# Patient Record
Sex: FEMALE | Race: WHITE | NOT HISPANIC OR LATINO | Employment: FULL TIME | URBAN - METROPOLITAN AREA
[De-identification: names, ages, dates, MRNs, and addresses within clinical notes are randomized per-mention and may not be internally consistent; named-entity substitution may affect disease eponyms.]

---

## 2023-10-16 PROBLEM — E78.5 BORDERLINE HYPERLIPIDEMIA: Status: ACTIVE | Noted: 2023-10-16

## 2023-10-16 PROBLEM — M81.0 AGE RELATED OSTEOPOROSIS: Status: ACTIVE | Noted: 2023-10-16

## 2023-10-16 PROBLEM — I10 HTN (HYPERTENSION): Status: ACTIVE | Noted: 2023-10-16

## 2023-10-16 PROBLEM — E55.9 VITAMIN D DEFICIENCY: Status: ACTIVE | Noted: 2023-10-16

## 2023-10-16 PROBLEM — R53.83 FATIGUE: Status: ACTIVE | Noted: 2023-10-16

## 2023-10-16 RX ORDER — LISINOPRIL 10 MG/1
1 TABLET ORAL DAILY
COMMUNITY
Start: 2020-03-02 | End: 2023-11-14 | Stop reason: SDUPTHER

## 2023-11-14 ENCOUNTER — OFFICE VISIT (OUTPATIENT)
Dept: PRIMARY CARE | Facility: CLINIC | Age: 78
End: 2023-11-14
Payer: MEDICARE

## 2023-11-14 VITALS
WEIGHT: 120 LBS | HEART RATE: 80 BPM | OXYGEN SATURATION: 97 % | SYSTOLIC BLOOD PRESSURE: 134 MMHG | DIASTOLIC BLOOD PRESSURE: 80 MMHG | BODY MASS INDEX: 25.19 KG/M2 | HEIGHT: 58 IN

## 2023-11-14 DIAGNOSIS — Z00.00 MEDICARE ANNUAL WELLNESS VISIT, SUBSEQUENT: ICD-10-CM

## 2023-11-14 DIAGNOSIS — M81.0 AGE-RELATED OSTEOPOROSIS WITHOUT CURRENT PATHOLOGICAL FRACTURE: ICD-10-CM

## 2023-11-14 DIAGNOSIS — I10 HYPERTENSION, UNSPECIFIED TYPE: ICD-10-CM

## 2023-11-14 DIAGNOSIS — Z00.00 ENCOUNTER FOR HEALTH MAINTENANCE EXAMINATION: Primary | ICD-10-CM

## 2023-11-14 DIAGNOSIS — E78.5 BORDERLINE HYPERLIPIDEMIA: ICD-10-CM

## 2023-11-14 DIAGNOSIS — E55.9 VITAMIN D DEFICIENCY: ICD-10-CM

## 2023-11-14 PROCEDURE — 1160F RVW MEDS BY RX/DR IN RCRD: CPT | Performed by: FAMILY MEDICINE

## 2023-11-14 PROCEDURE — 3079F DIAST BP 80-89 MM HG: CPT | Performed by: FAMILY MEDICINE

## 2023-11-14 PROCEDURE — 3075F SYST BP GE 130 - 139MM HG: CPT | Performed by: FAMILY MEDICINE

## 2023-11-14 PROCEDURE — 99214 OFFICE O/P EST MOD 30 MIN: CPT | Performed by: FAMILY MEDICINE

## 2023-11-14 PROCEDURE — 1036F TOBACCO NON-USER: CPT | Performed by: FAMILY MEDICINE

## 2023-11-14 PROCEDURE — 1159F MED LIST DOCD IN RCRD: CPT | Performed by: FAMILY MEDICINE

## 2023-11-14 PROCEDURE — 1170F FXNL STATUS ASSESSED: CPT | Performed by: FAMILY MEDICINE

## 2023-11-14 RX ORDER — LISINOPRIL 10 MG/1
10 TABLET ORAL DAILY
Qty: 90 TABLET | Refills: 3 | Status: SHIPPED | OUTPATIENT
Start: 2023-11-14

## 2023-11-14 ASSESSMENT — ACTIVITIES OF DAILY LIVING (ADL)
GROCERY_SHOPPING: INDEPENDENT
DOING_HOUSEWORK: INDEPENDENT
MANAGING_FINANCES: INDEPENDENT
BATHING: INDEPENDENT
TAKING_MEDICATION: INDEPENDENT
DRESSING: INDEPENDENT

## 2023-11-14 ASSESSMENT — PATIENT HEALTH QUESTIONNAIRE - PHQ9
2. FEELING DOWN, DEPRESSED OR HOPELESS: NOT AT ALL
1. LITTLE INTEREST OR PLEASURE IN DOING THINGS: NOT AT ALL
SUM OF ALL RESPONSES TO PHQ9 QUESTIONS 1 AND 2: 0

## 2023-11-14 NOTE — PROGRESS NOTES
General Medical Management Note and annual Medicare wellness visit    78 y.o. female presents for Medical Management  HPI    HTN - takes lisinopril only twice per week.  Uses several OTC nutritional supplements.  Exercises daily.  Healthy diet.      Fell once in the past 6 months - torn meniscus of knee.  MRI 4/2023.  Tx with dry needling by sports med physician.  Feeling good!    Another sebaceous cyst on scalp.  Would like removed.  Becoming irritated during grooming    Cologuard 2020 negative - screening no longer indicated due to age  Breast Cancer Screening - screening no longer indicated due to age  Cervical Cancer Screening - screening no longer indicated due to age    Immunizations refused by patient    Past Medical History:   Diagnosis Date    Other shoulder lesions, unspecified shoulder 12/30/2014    Rotator cuff tendinitis    Personal history of other diseases of the nervous system and sense organs 12/09/2015    History of sciatica      Past Surgical History:   Procedure Laterality Date    BREAST LUMPECTOMY  12/30/2014    Breast Surgery Lumpectomy    DILATION AND CURETTAGE OF UTERUS  12/30/2014    Dilation And Curettage     Family History   Problem Relation Name Age of Onset    Pancreatic cancer Father        Social History     Socioeconomic History    Marital status:      Spouse name: Not on file    Number of children: Not on file    Years of education: Not on file    Highest education level: Not on file   Occupational History    Not on file   Tobacco Use    Smoking status: Never    Smokeless tobacco: Never   Substance and Sexual Activity    Alcohol use: Not on file    Drug use: Not on file    Sexual activity: Not on file   Other Topics Concern    Not on file   Social History Narrative    Not on file     Social Determinants of Health     Financial Resource Strain: Not on file   Food Insecurity: Not on file   Transportation Needs: Not on file   Physical Activity: Not on file   Stress: Not on file  "  Social Connections: Not on file   Intimate Partner Violence: Not on file   Housing Stability: Not on file       Current Outpatient Medications on File Prior to Visit   Medication Sig Dispense Refill    lisinopril 10 mg tablet Take 1 tablet (10 mg) by mouth once daily.       No current facility-administered medications on file prior to visit.       Allergies   Allergen Reactions    Sulfa (Sulfonamide Antibiotics) Unknown         ROS: Denies chest pain, SOB, Headache, GI problems     Visit Vitals  /80   Pulse 80   Ht 1.473 m (4' 10\")   Wt 54.4 kg (120 lb)   SpO2 97%   BMI 25.08 kg/m²   Smoking Status Never   BSA 1.49 m²        PHYSICAL EXAM:  Gen.: Alert and oriented ×3 female in no acute distress.  HEENT: Head is normocephalic.  Pupils equal and reactive to light.  Tympanic membranes are clear.  Pharynx is clear.  Neck is supple without adenopathy or carotid bruits.  No masses or thyromegaly  Heart: Regular rate and rhythm without murmurs.  Lungs: Clear to auscultation bilaterally.  Abdomen: Soft with normal bowel sounds.  No masses or pain to palpation.  No bruits auscultated.  Extremities: Good range of motion of all joints.  No significant edema. Pedal pulses +1-2/4  Skin: No significant or irregular nevi visualized.  Neuro: No signs of focal neurologic deficit.  No tremor.  Speech and hearing are normal.  DTRs +3/4;  Muscle Strength +5/5.  Musculoskeletal: Spine with good ROM.  No scoliosis.  Leg lengths are equal.  Psych: normal affect.  No suicidal ideation.  Good judgement and insight.    The 10-year ASCVD risk score (Shagufta DUNCAN, et al., 2019) is: 25.9%    Values used to calculate the score:      Age: 78 years      Sex: Female      Is Non- : No      Diabetic: No      Tobacco smoker: No      Systolic Blood Pressure: 134 mmHg      Is BP treated: Yes      HDL Cholesterol: 94 mg/dL      Total Cholesterol: 202 mg/dL      DIAGNOSIS/PLAN:    2. Vitamin D deficiency  - Vitamin D " 25-Hydroxy,Total (for eval of Vitamin D levels); Future    3. Hypertension, unspecified type  - Comprehensive Metabolic Panel; Future  - lisinopril 10 mg tablet; Take 1 tablet (10 mg) by mouth once daily.  Dispense: 90 tablet; Refill: 3  - Comprehensive Metabolic Panel; Future  - CBC; Future  - Lipid Panel; Future  - TSH with reflex to Free T4 if abnormal; Future  - Urinalysis with Reflex Microscopic; Future      4. Age-related osteoporosis without current pathological fracture  - XR DEXA bone density; Future    5. Medicare annual wellness visit, subsequent  Living Will / Advanced Care Planning:  Discussed advance care planning including explanation and discussion of advanced directives.  Patient does have current up-to-date documents.       6. Borderline hyperlipidemia      Return to office in 12 months for comprehensive medical evaluation, long-term medication use monitoring, and preventative services screening    We will continue to monitor, evaluate, assess and treat all problems/diagnoses as appropriate and continue to collaborate with specialists.    Encouraged to sign up with [facility]    Contact office or send a  The Editorial[employer] message with any questions or concerns    Patient will only be notified of labs that require medical intervention.    Prescriptions will not be filled unless you are compliant with your follow up appointments or have a follow up appointment scheduled as per instruction of your physician. Refills should be requested at the time of your visit.    **Charting was completed using voice recognition technology and may include unintended errors**    Jose Guadalupe Marx DO, FACOFP  [address] [facility], [address]  [place] [address] [phone]    Jose Guadalupe Marx DO, FACOFP      Subjective   Reason for Visit: Brooke Chua is an 78 y.o. female here for a Medicare Wellness visit.               HPI    Patient Care Team:  Jose Guadalupe Marx DO as PCP - General  Jose Guadalupe Marx DO as PCP - [facility] Attributed Provider  Jose Guadalupe YEPEZ  "DO Francisco J as PCP - [employer] PCP     Review of Systems    Objective   Vitals:  /80   Pulse 80   Ht 1.473 m (4' 10\")   Wt 54.4 kg (120 lb)   SpO2 97%   BMI 25.08 kg/m²       Physical Exam    Assessment/Plan   Problem List Items Addressed This Visit       Age related osteoporosis    Relevant Orders    XR DEXA bone density    Fatigue    Relevant Orders    CBC    TSH with reflex to Free T4 if abnormal    HTN (hypertension)    Relevant Orders    Comprehensive Metabolic Panel    Vitamin D deficiency    Relevant Orders    Vitamin D 25-Hydroxy,Total (for eval of Vitamin D levels)     Other Visit Diagnoses       Routine general medical examination at health care facility    -  Primary    Encounter for health maintenance examination        Relevant Orders    Comprehensive Metabolic Panel    CBC    Lipid Panel    TSH with reflex to Free T4 if abnormal    Urinalysis with Reflex Microscopic    Screening, lipid        Relevant Orders    Lipid Panel               "

## 2023-12-05 ENCOUNTER — ANCILLARY PROCEDURE (OUTPATIENT)
Dept: RADIOLOGY | Facility: CLINIC | Age: 78
End: 2023-12-05
Payer: MEDICARE

## 2023-12-05 DIAGNOSIS — M81.0 AGE-RELATED OSTEOPOROSIS WITHOUT CURRENT PATHOLOGICAL FRACTURE: ICD-10-CM

## 2023-12-05 PROCEDURE — 77080 DXA BONE DENSITY AXIAL: CPT | Performed by: STUDENT IN AN ORGANIZED HEALTH CARE EDUCATION/TRAINING PROGRAM

## 2023-12-05 PROCEDURE — 77080 DXA BONE DENSITY AXIAL: CPT

## 2024-01-02 ENCOUNTER — APPOINTMENT (OUTPATIENT)
Dept: PRIMARY CARE | Facility: CLINIC | Age: 79
End: 2024-01-02
Payer: COMMERCIAL

## 2024-02-15 ENCOUNTER — PROCEDURE VISIT (OUTPATIENT)
Dept: PRIMARY CARE | Facility: CLINIC | Age: 79
End: 2024-02-15
Payer: MEDICARE

## 2024-02-15 DIAGNOSIS — L72.3 SEBACEOUS CYST: Primary | ICD-10-CM

## 2024-02-15 PROCEDURE — 11421 EXC H-F-NK-SP B9+MARG 0.6-1: CPT | Performed by: FAMILY MEDICINE

## 2024-02-15 PROCEDURE — 11423 EXC H-F-NK-SP B9+MARG 2.1-3: CPT | Performed by: FAMILY MEDICINE

## 2024-02-15 PROCEDURE — 11422 EXC H-F-NK-SP B9+MARG 1.1-2: CPT | Performed by: FAMILY MEDICINE

## 2024-02-15 NOTE — PROGRESS NOTES
Subjective     Patient ID: [id number]     Brooke Chua is a 79 y.o. female who presents for No chief complaint on file..    HPI  Cyst removal from scalp.  She had has had multiple cysts excised from her scalp over the past 20 years.  Patient feels that she has 3 cysts on the crown of her scalp and requests excision and dissected removal of cyst.  She was informed of the potential complications including bleeding, pain and infection.  -The patient states that these lesions have been growing in size over the past several years.  They are now painful when brushing her hair.  She denies drainage from any of the cystic lesions      Objective   There were no vitals taken for this visit.   Physical Exam:   Palpation of scalp reveals 3 areas on the crown of her scalp with subcutaneous structures resembling sebaceous cysts.  For purposes of documentation, the 3 cysts will bleed labeled 12:00 noon, 2:00 and 6:00.  Measurements: 12 noon = 2 cm, 2:00 = 1 cm, 6:00 = 3 cm    Involved scalp was cleansed with isopropyl alcohol  1% plain  lidocaine was infiltrated subcutaneously over the cysts.  After adequate anesthesia, a #15 blade was used to make a linear incision.  Cyst was dissected free from the dermis and removed.  All cysts were ruptured prior to excision.  A cheesy whitish material was extracted.  -2 sebaceous cysts were extracted from the 2:00 scalp wound.    Single 4-0 Ethilon suture was placed in the 12:00, 2:00 scalp incisions.  Two 4-0 Ethilon sutures placed in the 6:00 scalp incision.    Assessment/Plan   1. Sebaceous cyst x 4 - scalp  Keep incisions dry for 24 hours then may wash gently with shampoo    If pain or significant bulging of the scalp occurs, demonstrated gentle pressure over the wound as likely etiology is hematoma.  Anticipate being able to relieve symptoms by expressing the hematoma through the incision.    Return to office in 1 week for suture removal      Problem List Items Addressed This Visit     None      Jose Guadalupe Marx DO     Foreign Body Removal    Date/Time: 2/15/2024 1:29 PM    Performed by: Jose Guadalupe Marx DO  Authorized by: Jose Guadalupe Marx DO    Consent:     Consent obtained:  Verbal    Consent given by:  Patient    Risks discussed:  Bleeding, infection, pain and poor cosmetic result    Alternatives discussed:  No treatment  Universal protocol:     Procedure explained and questions answered to patient or proxy's satisfaction: yes      Immediately prior to procedure, a time out was called: yes      Patient identity confirmed:  Verbally with patient  Location:     Location:  Scalp    Scalp location:  Crown    Depth:  Intradermal    Tendon involvement:  None  Pre-procedure details:     Imaging:  None    Neurovascular status: intact

## 2024-02-22 ENCOUNTER — OFFICE VISIT (OUTPATIENT)
Dept: PRIMARY CARE | Facility: CLINIC | Age: 79
End: 2024-02-22
Payer: MEDICARE

## 2024-02-22 DIAGNOSIS — L72.3 SEBACEOUS CYST: Primary | ICD-10-CM

## 2024-02-22 PROCEDURE — 1036F TOBACCO NON-USER: CPT | Performed by: FAMILY MEDICINE

## 2024-02-27 NOTE — PROGRESS NOTES
Subjective     Patient ID: [id number]     Brooke Chua is a 79 y.o. female who presents for Suture / Staple Removal.    HPI  In office Procedure follow up  Objective   There were no vitals taken for this visit.   Physical Exam:   3 scalp wounds.  Well healed from 2/15/24 office procedure.  Single interrupted suture removed without difficulty.  Neosporin applied.  Tolerated well.     Assessment/Plan   1. Sebaceous cyst  No further treatment required  Notify me if any of the wounds swell, become painful or are bleeding.    Problem List Items Addressed This Visit    None  Visit Diagnoses       Sebaceous cyst    -  Primary            Jose Guadalupe Marx DO

## 2024-04-03 ENCOUNTER — PATIENT OUTREACH (OUTPATIENT)
Dept: PRIMARY CARE | Facility: CLINIC | Age: 79
End: 2024-04-03
Payer: MEDICARE

## 2024-12-27 ENCOUNTER — APPOINTMENT (OUTPATIENT)
Dept: PRIMARY CARE | Facility: CLINIC | Age: 79
End: 2024-12-27
Payer: MEDICARE

## 2024-12-27 VITALS
HEIGHT: 58 IN | HEART RATE: 74 BPM | DIASTOLIC BLOOD PRESSURE: 100 MMHG | WEIGHT: 116.2 LBS | OXYGEN SATURATION: 100 % | SYSTOLIC BLOOD PRESSURE: 190 MMHG | BODY MASS INDEX: 24.39 KG/M2

## 2024-12-27 DIAGNOSIS — M81.0 AGE-RELATED OSTEOPOROSIS WITHOUT CURRENT PATHOLOGICAL FRACTURE: ICD-10-CM

## 2024-12-27 DIAGNOSIS — E55.9 VITAMIN D DEFICIENCY: ICD-10-CM

## 2024-12-27 DIAGNOSIS — R30.0 DYSURIA: ICD-10-CM

## 2024-12-27 DIAGNOSIS — I10 HYPERTENSION, UNSPECIFIED TYPE: ICD-10-CM

## 2024-12-27 DIAGNOSIS — N63.41 SUBAREOLAR MASS OF RIGHT BREAST: Primary | ICD-10-CM

## 2024-12-27 DIAGNOSIS — Z00.00 MEDICARE ANNUAL WELLNESS VISIT, SUBSEQUENT: ICD-10-CM

## 2024-12-27 LAB
25(OH)D3 SERPL-MCNC: 69 NG/ML (ref 30–100)
ALBUMIN SERPL BCP-MCNC: 4.6 G/DL (ref 3.4–5)
ALP SERPL-CCNC: 67 U/L (ref 33–136)
ALT SERPL W P-5'-P-CCNC: 19 U/L (ref 7–45)
ANION GAP SERPL CALC-SCNC: 9 MMOL/L (ref 10–20)
APPEARANCE UR: CLEAR
AST SERPL W P-5'-P-CCNC: 18 U/L (ref 9–39)
BILIRUB SERPL-MCNC: 0.4 MG/DL (ref 0–1.2)
BILIRUB UR STRIP.AUTO-MCNC: NEGATIVE MG/DL
BUN SERPL-MCNC: 12 MG/DL (ref 6–23)
CALCIUM SERPL-MCNC: 9.7 MG/DL (ref 8.6–10.3)
CHLORIDE SERPL-SCNC: 100 MMOL/L (ref 98–107)
CHOLEST SERPL-MCNC: 224 MG/DL (ref 0–199)
CHOLESTEROL/HDL RATIO: 2.4
CO2 SERPL-SCNC: 29 MMOL/L (ref 21–32)
COLOR UR: ABNORMAL
CREAT SERPL-MCNC: 0.73 MG/DL (ref 0.5–1.05)
EGFRCR SERPLBLD CKD-EPI 2021: 85 ML/MIN/1.73M*2
ERYTHROCYTE [DISTWIDTH] IN BLOOD BY AUTOMATED COUNT: 13.3 % (ref 11.5–14.5)
GLUCOSE SERPL-MCNC: 96 MG/DL (ref 74–99)
GLUCOSE UR STRIP.AUTO-MCNC: NORMAL MG/DL
HCT VFR BLD AUTO: 43.7 % (ref 36–46)
HDLC SERPL-MCNC: 95.1 MG/DL
HGB BLD-MCNC: 14.3 G/DL (ref 12–16)
KETONES UR STRIP.AUTO-MCNC: NEGATIVE MG/DL
LDLC SERPL CALC-MCNC: 111 MG/DL
LEUKOCYTE ESTERASE UR QL STRIP.AUTO: ABNORMAL
MCH RBC QN AUTO: 30.8 PG (ref 26–34)
MCHC RBC AUTO-ENTMCNC: 32.7 G/DL (ref 32–36)
MCV RBC AUTO: 94 FL (ref 80–100)
NITRITE UR QL STRIP.AUTO: NEGATIVE
NON HDL CHOLESTEROL: 129 MG/DL (ref 0–149)
NRBC BLD-RTO: 0 /100 WBCS (ref 0–0)
PH UR STRIP.AUTO: 6.5 [PH]
PLATELET # BLD AUTO: 221 X10*3/UL (ref 150–450)
POTASSIUM SERPL-SCNC: 4.6 MMOL/L (ref 3.5–5.3)
PROT SERPL-MCNC: 7.3 G/DL (ref 6.4–8.2)
PROT UR STRIP.AUTO-MCNC: NEGATIVE MG/DL
RBC # BLD AUTO: 4.64 X10*6/UL (ref 4–5.2)
RBC # UR STRIP.AUTO: NEGATIVE /UL
RBC #/AREA URNS AUTO: NORMAL /HPF
SODIUM SERPL-SCNC: 133 MMOL/L (ref 136–145)
SP GR UR STRIP.AUTO: 1.01
TRIGL SERPL-MCNC: 92 MG/DL (ref 0–149)
TSH SERPL-ACNC: 2.27 MIU/L (ref 0.44–3.98)
UROBILINOGEN UR STRIP.AUTO-MCNC: NORMAL MG/DL
VLDL: 18 MG/DL (ref 0–40)
WBC # BLD AUTO: 7.6 X10*3/UL (ref 4.4–11.3)
WBC #/AREA URNS AUTO: NORMAL /HPF

## 2024-12-27 PROCEDURE — 1123F ACP DISCUSS/DSCN MKR DOCD: CPT | Performed by: FAMILY MEDICINE

## 2024-12-27 PROCEDURE — 1159F MED LIST DOCD IN RCRD: CPT | Performed by: FAMILY MEDICINE

## 2024-12-27 PROCEDURE — 81001 URINALYSIS AUTO W/SCOPE: CPT

## 2024-12-27 PROCEDURE — 84443 ASSAY THYROID STIM HORMONE: CPT

## 2024-12-27 PROCEDURE — 1036F TOBACCO NON-USER: CPT | Performed by: FAMILY MEDICINE

## 2024-12-27 PROCEDURE — 82306 VITAMIN D 25 HYDROXY: CPT

## 2024-12-27 PROCEDURE — 1170F FXNL STATUS ASSESSED: CPT | Performed by: FAMILY MEDICINE

## 2024-12-27 PROCEDURE — 3077F SYST BP >= 140 MM HG: CPT | Performed by: FAMILY MEDICINE

## 2024-12-27 PROCEDURE — G0439 PPPS, SUBSEQ VISIT: HCPCS | Performed by: FAMILY MEDICINE

## 2024-12-27 PROCEDURE — 3080F DIAST BP >= 90 MM HG: CPT | Performed by: FAMILY MEDICINE

## 2024-12-27 PROCEDURE — 80053 COMPREHEN METABOLIC PANEL: CPT

## 2024-12-27 PROCEDURE — 80061 LIPID PANEL: CPT

## 2024-12-27 PROCEDURE — 99215 OFFICE O/P EST HI 40 MIN: CPT | Performed by: FAMILY MEDICINE

## 2024-12-27 PROCEDURE — 99497 ADVNCD CARE PLAN 30 MIN: CPT | Performed by: FAMILY MEDICINE

## 2024-12-27 PROCEDURE — 93000 ELECTROCARDIOGRAM COMPLETE: CPT | Performed by: FAMILY MEDICINE

## 2024-12-27 PROCEDURE — 85027 COMPLETE CBC AUTOMATED: CPT

## 2024-12-27 PROCEDURE — 1160F RVW MEDS BY RX/DR IN RCRD: CPT | Performed by: FAMILY MEDICINE

## 2024-12-27 ASSESSMENT — PATIENT HEALTH QUESTIONNAIRE - PHQ9
SUM OF ALL RESPONSES TO PHQ9 QUESTIONS 1 AND 2: 0
SUM OF ALL RESPONSES TO PHQ9 QUESTIONS 1 AND 2: 0
2. FEELING DOWN, DEPRESSED OR HOPELESS: NOT AT ALL
1. LITTLE INTEREST OR PLEASURE IN DOING THINGS: NOT AT ALL
1. LITTLE INTEREST OR PLEASURE IN DOING THINGS: NOT AT ALL
2. FEELING DOWN, DEPRESSED OR HOPELESS: NOT AT ALL

## 2024-12-27 ASSESSMENT — ACTIVITIES OF DAILY LIVING (ADL)
GROCERY_SHOPPING: INDEPENDENT
DRESSING: INDEPENDENT
BATHING: INDEPENDENT
MANAGING_FINANCES: INDEPENDENT
TAKING_MEDICATION: INDEPENDENT
DOING_HOUSEWORK: INDEPENDENT

## 2024-12-27 NOTE — PROGRESS NOTES
Annual Comprehensive Medical Exam and annual Medicare wellness visit    79 y.o. female presents for annual comprehensive medical evaluation and preventive services screening.  No recent hospitalizations, surgeries or significant injuries.    History of Present Illness    Osteoporosis screening with DEXA scan revealed significant osteoporosis in the spine and femur/femoral neck.  Declines aggressive Tx with medication    Declines mammograms  Declines immunizations  Declines colon cancer screening    Blood pressure extremely elevated during office visits.  Patient declines to treat with medication.  Patient brings a log of multiple blood pressure readings from home.  The majority are in the 120s/90s.  Patient is aware that her diastolic blood pressure is elevated.      Right breast mass.  H/o abnormal mass on mammogram.  Biopsied.  Benign.  No longer trusts mammogram.  Concerned because felt something new at beginning of this month.  Has fibrocystic disease. Hasn't noticed any skin change or other observational changes.  Having a Thermogram next month at Dr. Danyelle schultz's office    Exercises daily.  Maintains ideal body weight.  Healthy diet.  No tobacco use.    Past Medical History:   Diagnosis Date    Other shoulder lesions, unspecified shoulder 12/30/2014    Rotator cuff tendinitis    Personal history of other diseases of the nervous system and sense organs 12/09/2015    History of sciatica      Past Surgical History:   Procedure Laterality Date    BREAST LUMPECTOMY  12/30/2014    Breast Surgery Lumpectomy    DILATION AND CURETTAGE OF UTERUS  12/30/2014    Dilation And Curettage     Family History   Problem Relation Name Age of Onset    Pancreatic cancer Father        Social History     Socioeconomic History    Marital status:      Spouse name: Not on file    Number of children: Not on file    Years of education: Not on file    Highest education level: Not on file   Occupational History    Not on file  "  Tobacco Use    Smoking status: Never    Smokeless tobacco: Never   Substance and Sexual Activity    Alcohol use: Not Currently    Drug use: Never    Sexual activity: Not on file   Other Topics Concern    Not on file   Social History Narrative    Not on file     Social Drivers of Health     Financial Resource Strain: Not on file   Food Insecurity: Not on file   Transportation Needs: Not on file   Physical Activity: Not on file   Stress: Not on file   Social Connections: Not on file   Intimate Partner Violence: Not on file   Housing Stability: Not on file       Current Outpatient Medications on File Prior to Visit   Medication Sig Dispense Refill    lisinopril 10 mg tablet Take 1 tablet (10 mg) by mouth once daily. (Patient not taking: Reported on 12/27/2024) 90 tablet 3     No current facility-administered medications on file prior to visit.       Allergies   Allergen Reactions    Sulfa (Sulfonamide Antibiotics) Unknown       Complete review of systems is negative today except for that mentioned in the history of present illness.  In particular patient denies chest pain, shortness of breath, headaches and GI disturbances.      Visit Vitals  BP (!) 190/100 (BP Location: Right arm, Patient Position: Sitting)   Pulse 74   Ht 1.473 m (4' 10\")   Wt 52.7 kg (116 lb 3.2 oz)   SpO2 100%   BMI 24.29 kg/m²   Smoking Status Never   BSA 1.47 m²      Vitals:    12/27/24 0953   BP: (!) 190/100   BP Location: Right arm   Patient Position: Sitting   Pulse: 74   SpO2: 100%   Weight: 52.7 kg (116 lb 3.2 oz)   Height: 1.473 m (4' 10\")     Physical Exam  Gen.: Alert and oriented ×3 female in no acute distress.  HEENT: Head is normocephalic.  Pupils equal and reactive to light.  Tympanic membranes are clear.  Pharynx is clear.  Neck is supple without adenopathy or carotid bruits.  No masses or thyromegaly  Heart: Regular rate and rhythm without murmurs.  Lungs: Clear to auscultation bilaterally.  Bilateral breast exam:  right breast " with subareolar smooth mobile mass.  Skin normal.  No galactorrhea.     Left breast exam normal.    Abdomen: Soft with normal bowel sounds.  No masses or pain to palpation.  No bruits auscultated.  Extremities: Good range of motion of all joints.  No significant edema. Pedal pulses +1-2/4  Skin: No significant or irregular nevi visualized.  Neuro: No signs of focal neurologic deficit.  No tremor.  Speech and hearing are normal.  DTRs +3/4;  Muscle Strength +5/5.  Musculoskeletal: Spine with good ROM.  No scoliosis.  Leg lengths are equal.  Psych: normal affect.  No suicidal ideation.  Good judgement and insight.     The ASCVD Risk score (Shagufta DUNCAN, et al., 2019) failed to calculate for the following reasons:    Cannot find a previous HDL lab    Cannot find a previous total cholesterol lab    ECG does not show signs of LVH from uncontrolled high blood pressure    Diagnosis/Plan  1. Hypertension, unspecified type (Primary)  -Hypertension: Discussed importance of good blood pressure control to avoid long-term complications such as heart attack and stroke.  Patient is aware that blood pressure goal is less than 130/80.  Maintaining a regular exercise program and body mass index (BMI) less than 25 as well as a diet lower in carbohydrates will help reach these goals.  - Requested patient bring her home blood pressure device to the office with each visit  - Comprehensive Metabolic Panel  - Lipid Panel  - TSH with reflex to Free T4 if abnormal    2. Age-related osteoporosis without current pathological fracture  -Patient is aware of her 2023 DEXA scan results.  She takes calcium and vitamin D supplements.  She does weightbearing exercise daily and yet the single DEXA scan that she has had in her life shows significant osteoporosis.  Patient declines to use medication.  She is aware of the increased risk for hip and spine fracture.    3. Medicare annual wellness visit, subsequent  Living Will / Advanced Care Planning: I  spent more than 15 minutes discussing advance care planning including explanation and discussion of advanced directives.  If patient does not have current up-to-date documents, examples and information were provided on how to create both living will and power of .  Patient was encouraged to work on completing these documents.      4. Vitamin D deficiency  - CBC  - Vitamin D 25-Hydroxy,Total (for eval of Vitamin D levels)    5. Dysuria  - Urinalysis with Reflex Microscopic    6.  Right breast mass  -Examination is not suspicious for malignancy.  Thermogram scheduled.  Patient will continue to monitor for change in size or skin changes and will notify me immediately.            Return to office in 6 months for comprehensive medical evaluation, long-term medication use monitoring, and preventative services screening    We will continue to monitor, evaluate, assess and treat all problems/diagnoses as appropriate and continue to collaborate with specialists.    Encouraged to sign up with [facility]    Contact office or send a  the [employer] message with any questions or concerns    Patient will only be notified of labs that require medical intervention.    Prescriptions will not be filled unless you are compliant with your follow up appointments or have a follow up appointment scheduled as per instruction of your physician. Refills should be requested at the time of your visit.    **Charting was completed using voice recognition technology and may include unintended errors**    Jose Guadalupe Marx DO, RADHAP  [id number] [id number]  [place] [address] [phone]    Jose Guadalupe Marx DO, RADHAP

## 2025-01-03 DIAGNOSIS — N63.41 SUBAREOLAR MASS OF RIGHT BREAST: Primary | ICD-10-CM

## 2025-01-13 DIAGNOSIS — N63.41 SUBAREOLAR MASS OF RIGHT BREAST: Primary | ICD-10-CM

## 2025-01-20 ENCOUNTER — HOSPITAL ENCOUNTER (OUTPATIENT)
Dept: RADIOLOGY | Facility: EXTERNAL LOCATION | Age: 80
Discharge: HOME | End: 2025-01-20
Payer: MEDICARE

## 2025-01-20 DIAGNOSIS — R92.8 ABNORMAL FINDINGS ON DIAGNOSTIC IMAGING OF BREAST: ICD-10-CM

## 2025-01-21 ENCOUNTER — TELEPHONE (OUTPATIENT)
Dept: SURGICAL ONCOLOGY | Facility: HOSPITAL | Age: 80
End: 2025-01-21
Payer: MEDICARE

## 2025-01-21 NOTE — TELEPHONE ENCOUNTER
Result Communication    Has apt 1/23/25    Resulted Orders   BI interpretation of outside films    Narrative    Interpreted By:  Karmen Link,   STUDY:  BI INTERPRETATION OF OUTSIDE FILMS;  1/21/2025 7:36 am      ACCESSION NUMBER(S):  [id number]      ORDERING CLINICIAN:  BRANDEE GONZALEZ      INDICATION:  Suspicious right breast mass seen on outside ultrasound. A second  opinion has been requested for ultrasound images dated 01/06/2025.  The reports from these studies are available for review.      This consultation was deemed medically necessary by the referring  health care provider, Brandee Gonzalez CNP.      ,R92.8 Other abnormal and inconclusive findings on diagnostic imaging  of breast      COMPARISON:  No prior imaging is available.      FINDINGS:  Sonographic Findings:  Limited sonographic images of the right breast were provided. In the  9 o'clock region of the right breast, anirregular microlobulated mass  is seen measuring 2.3 x 2.3 x 3.4 cm. It demonstrates internal  vascularity. Echogenic foci are seen within the mass suggestive of  calcifications. The mass directly abuts the dermis, with likely  extension into the dermis noted on some of the provided cine clips.      Targeted sonographic evaluation of the right axilla was also  performed. 2 morphologically abnormal lymph nodes are seen measuring  2.3 x 1.3 x 1.1 cm and 1.8 x 1.1 x 1.8 cm.        Impression    Suspicious right breast mass and right axillary lymphadenopathy.  Surgical consultation and ultrasound-guided biopsy of both findings  is recommended. Bilateral diagnostic mammogram is also recommended.      A preprocedure form was completed.      BI-RADS CATEGORY:  BI-RADS Category:  4 Suspicious.  Recommendation:  Surgical Consultation and Biopsy.  Recommended Date:  Immediate.  Laterality:  Right.      For any future breast imaging appointments, please call [phone] ([phone]).          MACRO:  Critical Finding:  See findings. Notification was  initiated on  1/21/2025 at 9:21 am by Dr. Karmen Link.  (**-YCF-**)  Instructions:  Surgical Consultation and Imaging Guided Biopsy.      Signed by: Karmen Link 1/21/2025 9:28 AM  Dictation workstation:   [id number]       12:59 PM

## 2025-01-23 ENCOUNTER — OFFICE VISIT (OUTPATIENT)
Dept: SURGICAL ONCOLOGY | Facility: HOSPITAL | Age: 80
End: 2025-01-23
Payer: MEDICARE

## 2025-01-23 VITALS
HEART RATE: 104 BPM | RESPIRATION RATE: 20 BRPM | WEIGHT: 112 LBS | SYSTOLIC BLOOD PRESSURE: 187 MMHG | DIASTOLIC BLOOD PRESSURE: 134 MMHG | BODY MASS INDEX: 23.51 KG/M2 | HEIGHT: 58 IN

## 2025-01-23 DIAGNOSIS — N63.41 SUBAREOLAR MASS OF RIGHT BREAST: ICD-10-CM

## 2025-01-23 DIAGNOSIS — R92.8 ABNORMAL FINDING ON BREAST IMAGING: Primary | ICD-10-CM

## 2025-01-23 DIAGNOSIS — R59.0 AXILLARY LYMPHADENOPATHY: ICD-10-CM

## 2025-01-23 PROCEDURE — 1036F TOBACCO NON-USER: CPT | Performed by: NURSE PRACTITIONER

## 2025-01-23 PROCEDURE — 3080F DIAST BP >= 90 MM HG: CPT | Performed by: NURSE PRACTITIONER

## 2025-01-23 PROCEDURE — 99204 OFFICE O/P NEW MOD 45 MIN: CPT | Performed by: NURSE PRACTITIONER

## 2025-01-23 PROCEDURE — 1123F ACP DISCUSS/DSCN MKR DOCD: CPT | Performed by: NURSE PRACTITIONER

## 2025-01-23 PROCEDURE — 3077F SYST BP >= 140 MM HG: CPT | Performed by: NURSE PRACTITIONER

## 2025-01-23 PROCEDURE — 1159F MED LIST DOCD IN RCRD: CPT | Performed by: NURSE PRACTITIONER

## 2025-01-23 PROCEDURE — 99214 OFFICE O/P EST MOD 30 MIN: CPT | Performed by: NURSE PRACTITIONER

## 2025-01-23 NOTE — PROGRESS NOTES
"[facility] [place]  Brooke Chua female   1945 80 y.o.   [id number]      Chief Complaint  New patient, abnormal imaging.    History Of Present Illness  Brooke Chua \"Teresa" is a 80 y.o. female presenting to the breast center with a right breast mass that she noticed 2024. She has a history of a right benign excisional biopsy 1992. She has no family history of breast cancer. She declines mammograms and does thermograms.     BREAST IMAGING: Outside image consult indicates BI-RADS Category 4. Suspicious right breast mass and right axillary lymphadenopathy. Surgical consultation and ultrasound-guided biopsy of both finding is recommended. Bilateral diagnostic mammogram is also recommended.    REPRODUCTIVE HISTORY: menarche age 15, , OCP's x 3 months, natural menopause age 52, no HRT    FAMILY CANCER HISTORY:   Father: Pancreatic cancer     Review of Systems  Constitutional:  Negative for appetite change, fatigue, fever and unexpected weight change.   HENT:  Negative for ear pain, hearing loss, nosebleeds, sore throat and trouble swallowing.    Eyes:  Negative for discharge, itching and visual disturbance.   Breast: As stated in HPI.  Respiratory:  Negative for cough, chest tightness and shortness of breath.    Cardiovascular:  Negative for chest pain, palpitations and leg swelling.   Gastrointestinal:  Negative for abdominal pain, constipation, diarrhea and nausea.   Endocrine: Negative for cold intolerance and heat intolerance.   Genitourinary:  Negative for dysuria, frequency, hematuria, pelvic pain and vaginal bleeding.   Musculoskeletal:  Negative for arthralgias, back pain, gait problem, joint swelling and myalgias.   Skin:  Negative for color change and rash.   Allergic/Immunologic: Negative for environmental allergies and food allergies.   Neurological:  Negative for dizziness, tremors, speech difficulty, weakness, numbness and headaches.   Hematological:  Does not bruise/bleed easily. "   Psychiatric/Behavioral:  Negative for agitation, dysphoric mood and sleep disturbance.     Past Medical History  She has a past medical history of Adverse effect of anesthesia (Given too much for body weight. Have trouble waking up. Want to be done under a local), Breast mass (12/1/2024), Other shoulder lesions, unspecified shoulder (12/30/2014), and Personal history of other diseases of the nervous system and sense organs (12/09/2015).    Surgical History  She has a past surgical history that includes Dilation and curettage of uterus (12/30/2014) and Breast lumpectomy (7/1/1992).    Family History  Cancer-related family history includes Cancer in her brother and father; Pancreatic cancer in her father.     Social History  She reports that she has never smoked. She has never used smokeless tobacco. She reports current alcohol use of about 2.0 standard drinks of alcohol per week. She reports that she does not use drugs.    Allergies  Sulfa (sulfonamide antibiotics)    Medications  No current outpatient medications    Last Recorded Vitals  Vitals:    01/23/25 1043   BP: (!) 187/134   Pulse: 104   Resp: 20   Pt asymptomatic and BP always high in office         Physical Exam  Chest:         Patient is alert and oriented x3. Her gait is steady. Sclera is clear. The breasts are nearly symmetrical. Right breast has a well healed excisional biopsy incision. Right subareolar mass 3.0 x 3.5 cm and firm. Right axillary lymphadenopathy with two large axillary lymph nodes palpated. The left breast tissue is soft without palpable abnormalities, discrete nodules or masses. The skin and nipples appear normal. There is no cervical, supraclavicular, or left axillary lymphadenopathy.     Relevant Results and Imaging  BI interpretation of outside films 01/21/2025    Narrative  Interpreted By:  Karmen Link,  STUDY:  BI INTERPRETATION OF OUTSIDE FILMS;  1/21/2025 7:36 am    ACCESSION NUMBER(S):  [id number]    ORDERING  CLINICIAN:  BRANDEE GONZALEZ    INDICATION:  Suspicious right breast mass seen on outside ultrasound. A second  opinion has been requested for ultrasound images dated 01/06/2025.  The reports from these studies are available for review.    This consultation was deemed medically necessary by the referring  health care provider, Brandee Gonzalez CNP.    ,R92.8 Other abnormal and inconclusive findings on diagnostic imaging  of breast    COMPARISON:  No prior imaging is available.    FINDINGS:  Sonographic Findings:  Limited sonographic images of the right breast were provided. In the  9 o'clock region of the right breast, anirregular microlobulated mass  is seen measuring 2.3 x 2.3 x 3.4 cm. It demonstrates internal  vascularity. Echogenic foci are seen within the mass suggestive of  calcifications. The mass directly abuts the dermis, with likely  extension into the dermis noted on some of the provided cine clips.    Targeted sonographic evaluation of the right axilla was also  performed. 2 morphologically abnormal lymph nodes are seen measuring  2.3 x 1.3 x 1.1 cm and 1.8 x 1.1 x 1.8 cm.    Impression  Suspicious right breast mass and right axillary lymphadenopathy.  Surgical consultation and ultrasound-guided biopsy of both findings  is recommended. Bilateral diagnostic mammogram is also recommended.    A preprocedure form was completed.    BI-RADS CATEGORY:  BI-RADS Category:  4 Suspicious.  Recommendation:  Surgical Consultation and Biopsy.  Recommended Date:  Immediate.  Laterality:  Right.    For any future breast imaging appointments, please call [phone] ([phone]).      MACRO:  Critical Finding:  See findings. Notification was initiated on  1/21/2025 at 9:21 am by Dr. Karmen Link.  (**-YCF-**)  Instructions:  Surgical Consultation and Imaging Guided Biopsy.    Signed by: Karmen Link 1/21/2025 9:28 AM  Dictation workstation:   [id number]      I explained the results in depth, along with suggested  explanation for follow up recommendations based on the testing results. BI-RADS Category 4      Visit Diagnosis  1. Abnormal finding on breast imaging        2. Subareolar mass of right breast  Referral to Breast Surgery      3. Axillary lymphadenopathy            Assessment  Abnormal imaging, right breast mass, right axillary lymphadenopathy, hx benign right excisional biopsy    Plan/Patient Discussion/Summary  Bilateral diagnostic mammogram and right breast ultrasound guided core biopsy of mass and lymph node was recommended. Patient declines mammogram and biopsy at this time. She can return to her PCP and follow up if she decides to proceed with biopsy.    You can see your health information, review clinical summaries from office visits & test results online when you follow your health with MY  Chart, a personal health record. To sign up go to [email or web address]. If you need assistance with signing up or trouble getting into your account call [facility] Patient Line 24/7 at [phone].    My office phone number is [phone] if you need to get in touch with me or have additional questions or concerns. Thank you for choosing [facility] and trusting me as your healthcare provider. I look forward to seeing you again at your next office visit. I am honored to be a provider on your health care team and I remain dedicated to helping you achieve your health goals.    HOLLIS Weaver-CNP

## 2025-12-29 ENCOUNTER — APPOINTMENT (OUTPATIENT)
Dept: PRIMARY CARE | Facility: CLINIC | Age: 80
End: 2025-12-29
Payer: MEDICARE